# Patient Record
Sex: MALE | Race: WHITE | URBAN - METROPOLITAN AREA
[De-identification: names, ages, dates, MRNs, and addresses within clinical notes are randomized per-mention and may not be internally consistent; named-entity substitution may affect disease eponyms.]

---

## 2022-04-28 ENCOUNTER — OUT OF OFFICE VISIT (OUTPATIENT)
Dept: URBAN - METROPOLITAN AREA MEDICAL CENTER 39 | Facility: MEDICAL CENTER | Age: 77
End: 2022-04-28
Payer: MEDICARE

## 2022-04-28 DIAGNOSIS — K26.9 CHILDHOOD DUODENAL ULCER: ICD-10-CM

## 2022-04-28 DIAGNOSIS — K29.60 ADENOPAPILLOMATOSIS GASTRICA: ICD-10-CM

## 2022-04-28 DIAGNOSIS — K22.89 DILATION OF ESOPHAGUS: ICD-10-CM

## 2022-04-28 DIAGNOSIS — Z98.84 BARIATRIC SURG STAT-UNSP: ICD-10-CM

## 2022-04-28 DIAGNOSIS — K26.9 DUODENAL ULCER: ICD-10-CM

## 2022-04-28 DIAGNOSIS — K92.1 ACUTE MELENA: ICD-10-CM

## 2022-04-28 DIAGNOSIS — Z79.82 ASPIRIN LONG-TERM USE: ICD-10-CM

## 2022-04-28 PROCEDURE — 99233 SBSQ HOSP IP/OBS HIGH 50: CPT | Performed by: INTERNAL MEDICINE

## 2022-04-28 PROCEDURE — 43236 UPPR GI SCOPE W/SUBMUC INJ: CPT | Performed by: INTERNAL MEDICINE

## 2022-04-28 PROCEDURE — G8427 DOCREV CUR MEDS BY ELIG CLIN: HCPCS | Performed by: INTERNAL MEDICINE

## 2022-04-28 PROCEDURE — 99223 1ST HOSP IP/OBS HIGH 75: CPT | Performed by: INTERNAL MEDICINE

## 2022-04-28 PROCEDURE — 43239 EGD BIOPSY SINGLE/MULTIPLE: CPT | Performed by: INTERNAL MEDICINE

## 2022-05-05 ENCOUNTER — TELEPHONE ENCOUNTER (OUTPATIENT)
Dept: URBAN - METROPOLITAN AREA CLINIC 98 | Facility: CLINIC | Age: 77
End: 2022-05-05

## 2022-05-05 RX ORDER — FLUCONAZOLE 40 MG/ML
5 ML POWDER, FOR SUSPENSION ORAL DAILY
Qty: 100 ML | Refills: 0 | OUTPATIENT
Start: 2022-05-05 | End: 2022-05-19

## 2022-07-12 ENCOUNTER — TELEPHONE ENCOUNTER (OUTPATIENT)
Dept: URBAN - METROPOLITAN AREA CLINIC 98 | Facility: CLINIC | Age: 77
End: 2022-07-12

## 2022-07-14 ENCOUNTER — OFFICE VISIT (OUTPATIENT)
Dept: URBAN - METROPOLITAN AREA CLINIC 98 | Facility: CLINIC | Age: 77
End: 2022-07-14
Payer: MEDICARE

## 2022-07-14 ENCOUNTER — LAB OUTSIDE AN ENCOUNTER (OUTPATIENT)
Dept: URBAN - METROPOLITAN AREA CLINIC 98 | Facility: CLINIC | Age: 77
End: 2022-07-14

## 2022-07-14 ENCOUNTER — WEB ENCOUNTER (OUTPATIENT)
Dept: URBAN - METROPOLITAN AREA CLINIC 98 | Facility: CLINIC | Age: 77
End: 2022-07-14

## 2022-07-14 VITALS
WEIGHT: 152 LBS | HEART RATE: 83 BPM | DIASTOLIC BLOOD PRESSURE: 81 MMHG | SYSTOLIC BLOOD PRESSURE: 116 MMHG | TEMPERATURE: 97.9 F | HEIGHT: 71 IN | BODY MASS INDEX: 21.28 KG/M2

## 2022-07-14 DIAGNOSIS — R19.7 DIARRHEA, UNSPECIFIED TYPE: ICD-10-CM

## 2022-07-14 DIAGNOSIS — K92.1 MELENA: ICD-10-CM

## 2022-07-14 DIAGNOSIS — Z87.19 HISTORY OF DUODENAL ULCER: ICD-10-CM

## 2022-07-14 PROCEDURE — 99214 OFFICE O/P EST MOD 30 MIN: CPT

## 2022-07-14 NOTE — HPI-TODAY'S VISIT:
Patient is a 76-year-old male who presents to follow-up from recent ER visit on July 12, 2022.   He presented to the ER for melena for 2 days similar to a previous GI bleed.   Was also having blood in catheter with decreased output.   Patient has a past medical history consistent with ALS, cervical spondylosis with radiculopathy, And lumbar  spondylosis with radiculopathy.   Labs were completed which revealed a hemoglobin of 12.4 and a hematocrit of 35.9.  [facility] but [facility] Wife reports patient was prescribed Bactrim for UTI, pantoprazole, and Carafate.   Stool is not still dark per wife but it is watery.  Patient deferred being admitted. Last EGD completed on 4/28/2022 performed by Dr. Mendel at [facility] for episodes of melena in setting of decreased hemoglobin.  White nummular lesions were noted in the lower third of the esophagus.  Patchy mildly erythematous mucosa without bleeding found in the stomach 1 nonbleeding cratered duodenal ulcer with adherent clot found in the second portion of the duodenum area was injected successfully with 4 mL of epinephrine. Diarrhea started weekend before ER visit  Home health nurse told wife that he had dark tarry stools that were similar to prvious episodes prior to last duodenal ulcer  Stools are 1-2 times/day- described as "watery"  Feeds via Gtube- uses [employer] blends 3x a day and supplement with activia  Prior to onset of symptoms, had solid stools. Occasionally has to use rectal stimulation with glycerin Denies vomiting or fever Currently taking pantoprazole BID and sucralfate BID through the G tube

## 2022-07-15 ENCOUNTER — DASHBOARD ENCOUNTERS (OUTPATIENT)
Age: 77
End: 2022-07-15

## 2022-07-15 LAB
A/G RATIO: 1.6
ALBUMIN: 3.7
ALKALINE PHOSPHATASE: 80
ALT (SGPT): 5
AST (SGOT): 12
BASO (ABSOLUTE): 0.1
BASOS: 1
BILIRUBIN, TOTAL: 0.2
BUN/CREATININE RATIO: 10
BUN: 4
CALCIUM: 9
CARBON DIOXIDE, TOTAL: 19
CHLORIDE: 91
CREATININE: 0.4
EGFR: 114
EOS (ABSOLUTE): 0.4
EOS: 4
GLOBULIN, TOTAL: 2.3
GLUCOSE: 92
HEMATOCRIT: 36.4
HEMATOLOGY COMMENTS:: (no result)
HEMOGLOBIN: 12
IMMATURE CELLS: (no result)
IMMATURE GRANS (ABS): 0.2
IMMATURE GRANULOCYTES: 2
LYMPHS (ABSOLUTE): 1.6
LYMPHS: 17
MCH: 28.9
MCHC: 33
MCV: 88
MONOCYTES(ABSOLUTE): 0.7
MONOCYTES: 8
NEUTROPHILS (ABSOLUTE): 6.4
NEUTROPHILS: 68
NRBC: (no result)
PLATELETS: 480
POTASSIUM: 4
PROTEIN, TOTAL: 6
RBC: 4.15
RDW: 13.4
SODIUM: 126
WBC: 9.3

## 2022-07-20 ENCOUNTER — CLAIMS CREATED FROM THE CLAIM WINDOW (OUTPATIENT)
Dept: URBAN - METROPOLITAN AREA MEDICAL CENTER 39 | Facility: MEDICAL CENTER | Age: 77
End: 2022-07-20
Payer: MEDICARE

## 2022-07-20 ENCOUNTER — CLAIMS CREATED FROM THE CLAIM WINDOW (OUTPATIENT)
Dept: URBAN - METROPOLITAN AREA MEDICAL CENTER 39 | Facility: MEDICAL CENTER | Age: 77
End: 2022-07-20

## 2022-07-20 DIAGNOSIS — K29.60 ADENOPAPILLOMATOSIS GASTRICA: ICD-10-CM

## 2022-07-20 DIAGNOSIS — K22.89 DILATATION OF ESOPHAGUS: ICD-10-CM

## 2022-07-20 DIAGNOSIS — K92.1 ACUTE MELENA: ICD-10-CM

## 2022-07-20 PROCEDURE — 43239 EGD BIOPSY SINGLE/MULTIPLE: CPT | Performed by: INTERNAL MEDICINE

## 2022-07-22 ENCOUNTER — TELEPHONE ENCOUNTER (OUTPATIENT)
Dept: URBAN - METROPOLITAN AREA CLINIC 98 | Facility: CLINIC | Age: 77
End: 2022-07-22

## 2022-07-22 LAB — GASTROINTESTINAL PATHOGEN: (no result)

## 2022-07-26 ENCOUNTER — LAB OUTSIDE AN ENCOUNTER (OUTPATIENT)
Dept: URBAN - METROPOLITAN AREA CLINIC 98 | Facility: CLINIC | Age: 77
End: 2022-07-26

## 2022-08-15 ENCOUNTER — OFFICE VISIT (OUTPATIENT)
Dept: URBAN - METROPOLITAN AREA CLINIC 98 | Facility: CLINIC | Age: 77
End: 2022-08-15

## 2022-11-10 ENCOUNTER — TELEPHONE ENCOUNTER (OUTPATIENT)
Dept: URBAN - METROPOLITAN AREA CLINIC 6 | Facility: CLINIC | Age: 77
End: 2022-11-10